# Patient Record
Sex: MALE | Race: WHITE | NOT HISPANIC OR LATINO
[De-identification: names, ages, dates, MRNs, and addresses within clinical notes are randomized per-mention and may not be internally consistent; named-entity substitution may affect disease eponyms.]

---

## 2019-05-17 ENCOUNTER — APPOINTMENT (OUTPATIENT)
Dept: PULMONOLOGY | Facility: CLINIC | Age: 54
End: 2019-05-17

## 2019-05-17 ENCOUNTER — APPOINTMENT (OUTPATIENT)
Dept: PULMONOLOGY | Facility: CLINIC | Age: 54
End: 2019-05-17
Payer: COMMERCIAL

## 2019-05-17 PROCEDURE — 94726 PLETHYSMOGRAPHY LUNG VOLUMES: CPT

## 2019-05-17 PROCEDURE — 94729 DIFFUSING CAPACITY: CPT

## 2019-05-17 PROCEDURE — 94060 EVALUATION OF WHEEZING: CPT

## 2019-05-23 ENCOUNTER — APPOINTMENT (OUTPATIENT)
Dept: GASTROENTEROLOGY | Facility: CLINIC | Age: 54
End: 2019-05-23
Payer: COMMERCIAL

## 2019-05-23 VITALS
WEIGHT: 257 LBS | SYSTOLIC BLOOD PRESSURE: 110 MMHG | HEIGHT: 70 IN | BODY MASS INDEX: 36.79 KG/M2 | TEMPERATURE: 98.5 F | DIASTOLIC BLOOD PRESSURE: 74 MMHG | OXYGEN SATURATION: 96 % | HEART RATE: 78 BPM

## 2019-05-23 DIAGNOSIS — R14.0 ABDOMINAL DISTENSION (GASEOUS): ICD-10-CM

## 2019-05-23 DIAGNOSIS — Z87.19 PERSONAL HISTORY OF OTHER DISEASES OF THE DIGESTIVE SYSTEM: ICD-10-CM

## 2019-05-23 DIAGNOSIS — Z00.00 ENCOUNTER FOR GENERAL ADULT MEDICAL EXAMINATION W/OUT ABNORMAL FINDINGS: ICD-10-CM

## 2019-05-23 DIAGNOSIS — Z80.41 FAMILY HISTORY OF MALIGNANT NEOPLASM OF OVARY: ICD-10-CM

## 2019-05-23 DIAGNOSIS — Z80.8 FAMILY HISTORY OF MALIGNANT NEOPLASM OF OTHER ORGANS OR SYSTEMS: ICD-10-CM

## 2019-05-23 PROCEDURE — 99203 OFFICE O/P NEW LOW 30 MIN: CPT

## 2019-05-23 RX ORDER — POLYETHYLENE GLYCOL 3350, SODIUM SULFATE, SODIUM CHLORIDE, POTASSIUM CHLORIDE, ASCORBIC ACID, SODIUM ASCORBATE 7.5-2.691G
100 KIT ORAL
Qty: 1 | Refills: 0 | Status: ACTIVE | COMMUNITY
Start: 2019-05-23 | End: 1900-01-01

## 2019-05-23 RX ORDER — DEXLANSOPRAZOLE 60 MG/1
60 CAPSULE, DELAYED RELEASE ORAL
Refills: 0 | Status: ACTIVE | COMMUNITY

## 2019-05-23 NOTE — HISTORY OF PRESENT ILLNESS
[FreeTextEntry1] : Patient came to the office today to arrange for upper endoscopy and colonoscopy. His chronic dyspeptic complaints and a history of previous colonic polyps. Past medical and surgical history noted below. Symptomatology includes occasional gassy bloating heartburn with belching and some irregularity to his bowels. He currently denies nausea vomiting fever chills rectal bleeding or melena.

## 2019-05-23 NOTE — ASSESSMENT
[FreeTextEntry1] : Impression and plan\par \par Patient presents to the office today to arrange for upper endoscopy and colonoscopy. He has upper and lower abdominal complaints. Last examinations were performed more than 6 years ago  The risks benefits alternatives and limitations of procedure were discussed further suggestions will follow.

## 2019-05-31 ENCOUNTER — APPOINTMENT (OUTPATIENT)
Dept: PULMONOLOGY | Facility: CLINIC | Age: 54
End: 2019-05-31
Payer: COMMERCIAL

## 2019-05-31 PROCEDURE — 95070 INHLJ BRNCL CHALLENGE TSTG: CPT

## 2019-05-31 PROCEDURE — 94070 EVALUATION OF WHEEZING: CPT

## 2019-06-04 RX ORDER — POLYETHYLENE GLYCOL-3350, SODIUM CHLORIDE, POTASSIUM CHLORIDE AND SODIUM BICARBONATE 420; 11.2; 5.72; 1.48 G/438.4G; G/438.4G; G/438.4G; G/438.4G
420 POWDER, FOR SOLUTION ORAL
Qty: 1 | Refills: 0 | Status: ACTIVE | COMMUNITY
Start: 2019-06-04 | End: 1900-01-01

## 2019-06-20 ENCOUNTER — APPOINTMENT (OUTPATIENT)
Dept: GASTROENTEROLOGY | Facility: AMBULATORY MEDICAL SERVICES | Age: 54
End: 2019-06-20
Payer: COMMERCIAL

## 2019-06-20 PROCEDURE — 45385 COLONOSCOPY W/LESION REMOVAL: CPT

## 2019-06-20 PROCEDURE — 43239 EGD BIOPSY SINGLE/MULTIPLE: CPT

## 2022-07-26 ENCOUNTER — APPOINTMENT (OUTPATIENT)
Dept: ORTHOPEDIC SURGERY | Facility: CLINIC | Age: 57
End: 2022-07-26

## 2022-07-26 ENCOUNTER — FORM ENCOUNTER (OUTPATIENT)
Age: 57
End: 2022-07-26

## 2022-07-26 VITALS — BODY MASS INDEX: 37.94 KG/M2 | WEIGHT: 265 LBS | HEIGHT: 70 IN

## 2022-07-26 DIAGNOSIS — E78.00 PURE HYPERCHOLESTEROLEMIA, UNSPECIFIED: ICD-10-CM

## 2022-07-26 DIAGNOSIS — M77.00 MEDIAL EPICONDYLITIS, UNSPECIFIED ELBOW: ICD-10-CM

## 2022-07-26 DIAGNOSIS — G56.20 LESION OF ULNAR NERVE, UNSPECIFIED UPPER LIMB: ICD-10-CM

## 2022-07-26 PROCEDURE — 99214 OFFICE O/P EST MOD 30 MIN: CPT

## 2022-07-26 PROCEDURE — 73562 X-RAY EXAM OF KNEE 3: CPT | Mod: RT

## 2022-07-26 NOTE — ASSESSMENT
[FreeTextEntry1] : right knee pain for years but getting worse since may 2022.  no injury or trauma.   mild oa.  possible oa exacerbation, possible mmt. \par \par also bilateral medial epicondylitis and possible cuts.

## 2022-07-26 NOTE — HISTORY OF PRESENT ILLNESS
[8] : 8 [0] : 0 [Intermittent] : intermittent [de-identified] : 7/26/22:  right knee pain for years but getting worse since may 2022.  no injury or trauma.   intermitttent episodes.  pain worse with bending, squatting, stairs, walking.  reports to tightness and stiffness.  reports he saw his pcp where xrays taken and given mobic prn for pain.   also having bilateral elbow pain for years.  reports to pain on the inside of the elbows as well as tingling in the forearm.  [] : no [FreeTextEntry1] : right knee [FreeTextEntry5] : No known injury, pain started occurring gradually [FreeTextEntry7] : lower leg

## 2022-07-26 NOTE — DISCUSSION/SUMMARY
[de-identified] : Instructions:  Progress Note completed by Nadiya Durant PA-C\par * Dr. Phillips -- The documentation recorded accurately reflects the decisions made by me during this visit.

## 2022-07-26 NOTE — PHYSICAL EXAM
[Positive] : positive Vera [Right] : right knee [Degenerative change] : Degenerative change [Bilateral] : elbow bilaterally [NL (150)] : flexion 150 degrees [NL (0)] : extension 0 degrees [NL (90)] : supination 90 degrees [5___] : supination 5[unfilled]/5 [] : no ecchymosis [FreeTextEntry9] : mild oa.  no acute fx noted.  [TWNoteComboBox7] : flexion 105 degrees

## 2022-07-27 ENCOUNTER — APPOINTMENT (OUTPATIENT)
Dept: MRI IMAGING | Facility: CLINIC | Age: 57
End: 2022-07-27

## 2022-07-27 PROCEDURE — 73721 MRI JNT OF LWR EXTRE W/O DYE: CPT | Mod: RT,MH

## 2022-08-05 ENCOUNTER — APPOINTMENT (OUTPATIENT)
Dept: ORTHOPEDIC SURGERY | Facility: CLINIC | Age: 57
End: 2022-08-05

## 2022-08-05 VITALS — HEIGHT: 70 IN | BODY MASS INDEX: 37.94 KG/M2 | WEIGHT: 265 LBS

## 2022-08-05 PROCEDURE — 99214 OFFICE O/P EST MOD 30 MIN: CPT

## 2022-08-05 NOTE — PHYSICAL EXAM
[Bilateral] : elbow bilaterally [NL (150)] : flexion 150 degrees [NL (90)] : supination 90 degrees [NL (0)] : extension 0 degrees [5___] : hamstring 5[unfilled]/5 [Positive] : positive Vera [Right] : right knee [Degenerative change] : Degenerative change [4___] : quadriceps 4[unfilled]/5 [] : no ecchymosis [FreeTextEntry9] : mild oa.  no acute fx noted.  [TWNoteComboBox7] : flexion 110 degrees

## 2022-08-05 NOTE — HISTORY OF PRESENT ILLNESS
[8] : 8 [0] : 0 [Intermittent] : intermittent [de-identified] : 7/26/22:  right knee pain for years but getting worse since may 2022.  no injury or trauma.   intermitttent episodes.  pain worse with bending, squatting, stairs, walking.  reports to tightness and stiffness.  reports he saw his pcp where xrays taken and given mobic prn for pain.   also having bilateral elbow pain for years.  reports to pain on the inside of the elbows as well as tingling in the forearm. \par 8/5/22:  follow up right knee.  still a lot of medial pain [] : no [FreeTextEntry1] : right knee [FreeTextEntry5] : No known injury, pain started occurring gradually [FreeTextEntry7] : lower leg

## 2022-08-05 NOTE — ASSESSMENT
[FreeTextEntry1] : right knee pain for years but getting worse since may 2022.  no injury or trauma.   large tear of medial meniscus. some oa present.\par \par also bilateral medial epicondylitis and possible cuts.

## 2022-08-09 ENCOUNTER — APPOINTMENT (OUTPATIENT)
Dept: ORTHOPEDIC SURGERY | Facility: CLINIC | Age: 57
End: 2022-08-09

## 2022-12-16 ENCOUNTER — APPOINTMENT (OUTPATIENT)
Dept: ORTHOPEDIC SURGERY | Facility: CLINIC | Age: 57
End: 2022-12-16
Payer: MEDICARE

## 2022-12-16 VITALS — BODY MASS INDEX: 37.94 KG/M2 | WEIGHT: 265 LBS | HEIGHT: 70 IN

## 2022-12-16 DIAGNOSIS — M17.11 UNILATERAL PRIMARY OSTEOARTHRITIS, RIGHT KNEE: ICD-10-CM

## 2022-12-16 DIAGNOSIS — M25.461 EFFUSION, RIGHT KNEE: ICD-10-CM

## 2022-12-16 PROCEDURE — 99214 OFFICE O/P EST MOD 30 MIN: CPT | Mod: 25

## 2022-12-16 PROCEDURE — 20611 DRAIN/INJ JOINT/BURSA W/US: CPT | Mod: RT

## 2022-12-16 PROCEDURE — J3490N: CUSTOM | Mod: NC

## 2022-12-16 RX ORDER — INDOMETHACIN 50 MG/1
50 CAPSULE ORAL
Qty: 60 | Refills: 1 | Status: ACTIVE | COMMUNITY
Start: 2022-12-16 | End: 1900-01-01

## 2022-12-16 NOTE — PROCEDURE
[Large Joint Injection] : Large joint injection [Right] : of the right [Knee] : knee [Pain] : pain [Inflammation] : inflammation [Alcohol] : alcohol [Betadine] : betadine [___ cc    3mg] :  Betamethasone (Celestone) ~Vcc of 3mg [___ cc    1%] : Lidocaine ~Vcc of 1%  [___ cc    0.25%] : Bupivacaine (Marcaine) ~Vcc of 0.25%  [Effusion] : effusion [Cell count/dif] : cell count/dif [GS] : GS [Culture] : culture [Crystals] : crystals [] : Patient tolerated procedure well [Call if redness, pain or fever occur] : call if redness, pain or fever occur [Apply ice for 15min out of every hour for the next 12-24 hours as tolerated] : apply ice for 15 minutes out of every hour for the next 12-24 hours as tolerated [Patient was advised to rest the joint(s) for ____ days] : patient was advised to rest the joint(s) for [unfilled] days [Previous OTC use and PT nontherapeutic] : patient has tried OTC's including aspirin, Ibuprofen, Aleve, etc or prescription NSAIDS, and/or exercises at home and/or physical therapy without satisfactory response [Patient had decreased mobility in the joint] : patient had decreased mobility in the joint [Risks, benefits, alternatives discussed / Verbal consent obtained] : the risks benefits, and alternatives have been discussed, and verbal consent was obtained [Prior failure or difficult injection] : prior failure or difficult injection [All ultrasound images have been permanently captured and stored accordingly in our picture archiving and communication system] : All ultrasound images have been permanently captured and stored accordingly in our picture archiving and communication system [Visualization of the needle and placement of injection was performed without complication] : visualization of the needle and placement of injection was performed without complication [de-identified] : 40 [de-identified] : cloudy yellow

## 2022-12-16 NOTE — HISTORY OF PRESENT ILLNESS
[10] : 10 [4] : 4 [Sharp] : sharp [Stabbing] : stabbing [Throbbing] : throbbing [Constant] : constant [Household chores] : household chores [Leisure] : leisure [Sleep] : sleep [Meds] : meds [Sitting] : sitting [Standing] : standing [Walking] : walking [Stairs] : stairs [Lying in bed] : lying in bed [Retired] : Work status: retired [de-identified] : 12/16/22:  new onset of right knee pain since 12/14/22.  no injury.  reports to swelling.  no fevers or chills.  pain continues to persist.  worse with bending, walking.  knee is stiff.  the knee igor.   [] : no [FreeTextEntry1] : right knee [FreeTextEntry9] : oxycodone- old rx

## 2022-12-16 NOTE — ASSESSMENT
[FreeTextEntry1] : new onset of right knee pain since 12/14/22.  no new injury.  mri spring 2022 with large tear of medial meniscus. some oa present.  effusion.  no fevers or chills.  possible inflammatory flare up?  possible propagation of mmt. \par \par history of gout - last knee flare up was in 2016 - on allopruinol.

## 2022-12-16 NOTE — PHYSICAL EXAM
[Right] : right knee [5___] : hamstring 5[unfilled]/5 [Positive] : positive Vera [] : ambulation with crutches [TWNoteComboBox7] : flexion 90 degrees [de-identified] : extension 3 degrees

## 2022-12-16 NOTE — DISCUSSION/SUMMARY
[de-identified] : Progress Note completed by Nadiya Durant PA-C\par * Dr. Phillips -- The documentation recorded in this note accurately reflects the decisions made by me during this visit.

## 2022-12-21 LAB
B PERT IGG+IGM PNL SER: ABNORMAL
COLOR FLD: YELLOW
EOSINOPHIL # FLD MANUAL: 0 %
FLUID INTAKE SUBSTANCE CLASS: NORMAL
LYMPHOCYTES # FLD MANUAL: 3 %
MESOTHL CELL NFR FLD: 0 %
MONOS+MACROS NFR FLD MANUAL: 16 %
NEUTS SEG # FLD MANUAL: 81 %
NRBC # FLD: 0 %
RBC # FLD MANUAL: 1000 /UL
SYCRY CLARITY: ABNORMAL
SYCRY COLOR: YELLOW
SYCRY ID: ABNORMAL
SYCRY TUBE: NORMAL
TOTAL CELLS COUNTED FLD: NORMAL /UL
TUBE TYPE: NORMAL
UNIDENT CELLS NFR FLD MANUAL: 0 %
VARIANT LYMPHS # FLD MANUAL: 0 %

## 2023-01-03 ENCOUNTER — APPOINTMENT (OUTPATIENT)
Dept: ORTHOPEDIC SURGERY | Facility: CLINIC | Age: 58
End: 2023-01-03
Payer: MEDICARE

## 2023-01-03 VITALS — HEIGHT: 70 IN | BODY MASS INDEX: 37.94 KG/M2 | WEIGHT: 265 LBS

## 2023-01-03 DIAGNOSIS — M25.561 PAIN IN RIGHT KNEE: ICD-10-CM

## 2023-01-03 DIAGNOSIS — S83.241D OTHER TEAR OF MEDIAL MENISCUS, CURRENT INJURY, RIGHT KNEE, SUBSEQUENT ENCOUNTER: ICD-10-CM

## 2023-01-03 PROCEDURE — 99214 OFFICE O/P EST MOD 30 MIN: CPT

## 2023-01-03 NOTE — PHYSICAL EXAM
[Right] : right knee [4___] : quadriceps 4[unfilled]/5 [5___] : hamstring 5[unfilled]/5 [Positive] : positive Vera [] : ambulation with crutches [TWNoteComboBox7] : flexion 120 degrees [de-identified] : extension 0 degrees

## 2023-01-03 NOTE — HISTORY OF PRESENT ILLNESS
[de-identified] : 12/16/22:  new onset of right knee pain since 12/14/22.  no injury.  reports to swelling.  no fevers or chills.  pain continues to persist.  worse with bending, walking.  knee is stiff.  the knee igor.  \par 1/3/23: follow up. still some medial knee pain. [10] : 10 [4] : 4 [Sharp] : sharp [Stabbing] : stabbing [Throbbing] : throbbing [Constant] : constant [Household chores] : household chores [Leisure] : leisure [Sleep] : sleep [Meds] : meds [Sitting] : sitting [Standing] : standing [Walking] : walking [Stairs] : stairs [Lying in bed] : lying in bed [] : no [Retired] : Work status: retired [FreeTextEntry1] : right knee [FreeTextEntry9] : oxycodone- old rx

## 2023-01-03 NOTE — DATA REVIEWED
[Laboratory studies were reviewed and noted in the chart] : Laboratory studies were reviewed and noted in the chart [FreeTextEntry1] : labs consistent with gout, pseudogout.

## 2023-01-03 NOTE — ASSESSMENT
[FreeTextEntry1] : new onset of right knee pain since 12/14/22.  no new injury.  mri spring 2022 with large tear of medial meniscus. some oa present.  effusion.  improved with aspiraiton injection but still medial pain. pain now mostly from oa.  aspirate consistent with gout AND pseudgout but large tear present on prior mri which is bothering him now mostly likley. \par \par history of gout - last knee flare up was in 2016 - on allopruinol.

## 2023-01-26 ENCOUNTER — LABORATORY RESULT (OUTPATIENT)
Age: 58
End: 2023-01-26

## 2023-01-30 ENCOUNTER — APPOINTMENT (OUTPATIENT)
Age: 58
End: 2023-01-30
Payer: MEDICARE

## 2023-01-30 PROCEDURE — 29881 ARTHRS KNE SRG MNISECTMY M/L: CPT | Mod: RT

## 2023-01-30 PROCEDURE — 29881 ARTHRS KNE SRG MNISECTMY M/L: CPT | Mod: AS,RT

## 2023-01-30 RX ORDER — ASPIRIN/ACETAMINOPHEN/CAFFEINE 500-325-65
325 POWDER IN PACKET (EA) ORAL
Qty: 30 | Refills: 0 | Status: ACTIVE | COMMUNITY
Start: 2023-01-30 | End: 1900-01-01

## 2023-01-30 RX ORDER — OXYCODONE AND ACETAMINOPHEN 5; 325 MG/1; MG/1
5-325 TABLET ORAL
Qty: 30 | Refills: 0 | Status: ACTIVE | COMMUNITY
Start: 2023-01-30 | End: 1900-01-01

## 2023-02-10 ENCOUNTER — APPOINTMENT (OUTPATIENT)
Dept: ORTHOPEDIC SURGERY | Facility: CLINIC | Age: 58
End: 2023-02-10
Payer: MEDICARE

## 2023-02-10 VITALS — HEIGHT: 70 IN | WEIGHT: 260 LBS | BODY MASS INDEX: 37.22 KG/M2

## 2023-02-10 PROCEDURE — 99024 POSTOP FOLLOW-UP VISIT: CPT

## 2023-02-10 NOTE — ASSESSMENT
[FreeTextEntry1] : s/p right knee scope for pmm, synovectomy and chondroplasty of trochlea on 1/30/23.  doing well. \par \par history of gout - last knee flare up was in 2016 - on allopruinol. \par \par retired

## 2023-02-10 NOTE — HISTORY OF PRESENT ILLNESS
[2] : 2 [1] : 2 [Dull/Aching] : dull/aching [Constant] : constant [de-identified] : 2/10/23:  s/p right knee scope on 1/30/23.  doing well.  [] : no [FreeTextEntry1] : Left knee [FreeTextEntry5] : LACEY LONDON is a 57 year old M here for PO #1 for the left knee.  [de-identified] : 2/8/23 [de-identified] : 1/30/23 [de-identified] : Right knee arthroscopy with partial medial meniscectomy

## 2023-02-10 NOTE — DISCUSSION/SUMMARY
[de-identified] : Progress Note completed by Nadiya Durant PA-C\par * Dr. Phillips -- The documentation recorded in this note accurately reflects the decisions made by me during this visit.

## 2023-03-10 ENCOUNTER — APPOINTMENT (OUTPATIENT)
Dept: ORTHOPEDIC SURGERY | Facility: CLINIC | Age: 58
End: 2023-03-10

## 2023-03-17 ENCOUNTER — APPOINTMENT (OUTPATIENT)
Dept: ORTHOPEDIC SURGERY | Facility: CLINIC | Age: 58
End: 2023-03-17
Payer: MEDICARE

## 2023-03-17 ENCOUNTER — NON-APPOINTMENT (OUTPATIENT)
Age: 58
End: 2023-03-17

## 2023-03-17 DIAGNOSIS — M23.91 UNSPECIFIED INTERNAL DERANGEMENT OF RIGHT KNEE: ICD-10-CM

## 2023-03-17 PROCEDURE — 99024 POSTOP FOLLOW-UP VISIT: CPT

## 2023-03-17 NOTE — ASSESSMENT
[FreeTextEntry1] : s/p right knee scope for pmm, synovectomy and chondroplasty of trochlea on 1/30/23.  improving with tightness. \par \par history of gout - last knee flare up was in 2016 - on allopruinol. \par \par retired

## 2023-03-17 NOTE — HISTORY OF PRESENT ILLNESS
[] : no [FreeTextEntry1] : right knee  [FreeTextEntry5] : LACEY is here today for right knee post op visit #2. pt states pain is decreasing.  [de-identified] : physical therapy  [de-identified] : 01/30/23 [de-identified] : R knee scope

## 2023-03-17 NOTE — DISCUSSION/SUMMARY
[de-identified] : Progress Note completed by Nadiya Durant PA-C\par * Dr. Phillips -- The documentation recorded in this note accurately reflects the decisions made by me during this visit.

## 2023-03-17 NOTE — PHYSICAL EXAM
[Right] : right knee [NL (0)] : extension 0 degrees [4___] : hamstring 4[unfilled]/5 [] : ligamentously stable [FreeTextEntry3] : well healed surgical scars [TWNoteComboBox7] : flexion 130 degrees

## 2023-03-17 NOTE — HISTORY OF PRESENT ILLNESS
[2] : 2 [0] : 0 [Intermittent] : intermittent [Rest] : rest [Stairs] : stairs [] : Post Surgical Visit: yes [de-identified] : 2/10/23:  s/p right knee scope on 1/30/23.  doing well. \par 3/17/23:  follow up right knee.  improving.  stiffness with stairs.